# Patient Record
Sex: FEMALE | Race: BLACK OR AFRICAN AMERICAN | NOT HISPANIC OR LATINO | Employment: FULL TIME | URBAN - METROPOLITAN AREA
[De-identification: names, ages, dates, MRNs, and addresses within clinical notes are randomized per-mention and may not be internally consistent; named-entity substitution may affect disease eponyms.]

---
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
## 2023-09-08 ENCOUNTER — HOSPITAL ENCOUNTER (EMERGENCY)
Facility: CLINIC | Age: 18
Discharge: HOME | End: 2023-09-08
Attending: EMERGENCY MEDICINE | Admitting: EMERGENCY MEDICINE
Payer: COMMERCIAL

## 2023-09-08 ENCOUNTER — APPOINTMENT (OUTPATIENT)
Dept: GENERAL RADIOLOGY | Facility: CLINIC | Age: 18
End: 2023-09-08
Attending: EMERGENCY MEDICINE
Payer: COMMERCIAL

## 2023-09-08 VITALS
SYSTOLIC BLOOD PRESSURE: 118 MMHG | HEART RATE: 58 BPM | OXYGEN SATURATION: 99 % | DIASTOLIC BLOOD PRESSURE: 87 MMHG | TEMPERATURE: 98 F | RESPIRATION RATE: 16 BRPM

## 2023-09-08 DIAGNOSIS — R07.9 CHEST PAIN, UNSPECIFIED TYPE: ICD-10-CM

## 2023-09-08 LAB
ANION GAP SERPL CALCULATED.3IONS-SCNC: 12 MMOL/L (ref 7–15)
ATRIAL RATE - MUSE: 56 BPM
BASOPHILS # BLD AUTO: 0 10E3/UL (ref 0–0.2)
BASOPHILS NFR BLD AUTO: 0 %
BUN SERPL-MCNC: 8.2 MG/DL (ref 6–20)
CALCIUM SERPL-MCNC: 9 MG/DL (ref 8.6–10)
CHLORIDE SERPL-SCNC: 106 MMOL/L (ref 98–107)
CREAT SERPL-MCNC: 0.79 MG/DL (ref 0.51–0.95)
DEPRECATED HCO3 PLAS-SCNC: 23 MMOL/L (ref 22–29)
DIASTOLIC BLOOD PRESSURE - MUSE: NORMAL MMHG
EGFRCR SERPLBLD CKD-EPI 2021: >90 ML/MIN/1.73M2
EOSINOPHIL # BLD AUTO: 0.2 10E3/UL (ref 0–0.7)
EOSINOPHIL NFR BLD AUTO: 4 %
ERYTHROCYTE [DISTWIDTH] IN BLOOD BY AUTOMATED COUNT: 12.5 % (ref 10–15)
GLUCOSE SERPL-MCNC: 105 MG/DL (ref 70–99)
HCG INTACT+B SERPL-ACNC: <1 MIU/ML
HCT VFR BLD AUTO: 41 % (ref 35–47)
HGB BLD-MCNC: 13.6 G/DL (ref 11.7–15.7)
IMM GRANULOCYTES # BLD: 0 10E3/UL
IMM GRANULOCYTES NFR BLD: 0 %
INTERPRETATION ECG - MUSE: NORMAL
LYMPHOCYTES # BLD AUTO: 2.6 10E3/UL (ref 0.8–5.3)
LYMPHOCYTES NFR BLD AUTO: 44 %
MCH RBC QN AUTO: 29.1 PG (ref 26.5–33)
MCHC RBC AUTO-ENTMCNC: 33.2 G/DL (ref 31.5–36.5)
MCV RBC AUTO: 88 FL (ref 78–100)
MONOCYTES # BLD AUTO: 0.5 10E3/UL (ref 0–1.3)
MONOCYTES NFR BLD AUTO: 8 %
NEUTROPHILS # BLD AUTO: 2.6 10E3/UL (ref 1.6–8.3)
NEUTROPHILS NFR BLD AUTO: 44 %
NRBC # BLD AUTO: 0 10E3/UL
NRBC BLD AUTO-RTO: 0 /100
P AXIS - MUSE: 37 DEGREES
PLATELET # BLD AUTO: 261 10E3/UL (ref 150–450)
POTASSIUM SERPL-SCNC: 3.8 MMOL/L (ref 3.4–5.3)
PR INTERVAL - MUSE: 140 MS
QRS DURATION - MUSE: 82 MS
QT - MUSE: 416 MS
QTC - MUSE: 401 MS
R AXIS - MUSE: 40 DEGREES
RBC # BLD AUTO: 4.68 10E6/UL (ref 3.8–5.2)
SODIUM SERPL-SCNC: 141 MMOL/L (ref 136–145)
SYSTOLIC BLOOD PRESSURE - MUSE: NORMAL MMHG
T AXIS - MUSE: 43 DEGREES
TROPONIN T SERPL HS-MCNC: <6 NG/L
VENTRICULAR RATE- MUSE: 56 BPM
WBC # BLD AUTO: 5.8 10E3/UL (ref 4–11)

## 2023-09-08 PROCEDURE — 85025 COMPLETE CBC W/AUTO DIFF WBC: CPT | Performed by: EMERGENCY MEDICINE

## 2023-09-08 PROCEDURE — 84484 ASSAY OF TROPONIN QUANT: CPT | Performed by: EMERGENCY MEDICINE

## 2023-09-08 PROCEDURE — 80048 BASIC METABOLIC PNL TOTAL CA: CPT | Performed by: EMERGENCY MEDICINE

## 2023-09-08 PROCEDURE — 71046 X-RAY EXAM CHEST 2 VIEWS: CPT

## 2023-09-08 PROCEDURE — 99285 EMERGENCY DEPT VISIT HI MDM: CPT | Mod: 25

## 2023-09-08 PROCEDURE — 36415 COLL VENOUS BLD VENIPUNCTURE: CPT | Performed by: EMERGENCY MEDICINE

## 2023-09-08 PROCEDURE — 93005 ELECTROCARDIOGRAM TRACING: CPT

## 2023-09-08 PROCEDURE — 84702 CHORIONIC GONADOTROPIN TEST: CPT | Performed by: EMERGENCY MEDICINE

## 2023-09-08 ASSESSMENT — ACTIVITIES OF DAILY LIVING (ADL): ADLS_ACUITY_SCORE: 37

## 2023-09-08 NOTE — ED TRIAGE NOTES
Pt had chest pain and heaviness on left side of body   Pt states is started around 3 am

## 2023-09-08 NOTE — ED NOTES
Bed: ED27  Expected date:   Expected time:   Means of arrival:   Comments:  HEMS 416 18F heaviness on left side of body eta 0338

## 2023-09-08 NOTE — ED PROVIDER NOTES
History     Chief Complaint:  Chest Pain       HPI   Malika Marie is a 18 year old female who present to the emergency department for evaluation of chest pain. Patient reports that she was sleeping last night when she woke up at 0200/0300 in the morning with sharp chest pain and pressure accompanied by shortness of breath and nausea. This pain has occurred every day, usually while walking. It radiates to her back and is alleviated when she stops walking. This has been going on for about 3 weeks. It is exacerbated by eating and drinking and is occasionally accompanied by an acid taste. Spicy or fatty foods do not seem to make it worse. Patient has no history of asthma or heart problems and is not on any medications. She has not had any recent travel or surgery and has no history of blood clots. She has no family history of heart attacks, blood clots, or sudden unexplained death. She is not on birth control. Patient denies abdominal pain and pain or swelling of the lower extremities.       Independent Historian:   None - Patient Only    Review of External Notes:          Medications:    Celexa    Past Medical History:    Child foster care  Adolescent depression   Adjustment disorder with mixed disturbance of emotions and conduct     Physical Exam   Patient Vitals for the past 24 hrs:   BP Temp Temp src Pulse Resp SpO2   09/08/23 0344 118/87 98  F (36.7  C) Oral 58 16 99 %        Physical Exam  Constitutional: Well appearing.  HEENT: Atraumatic.  PERRL.  EOMI.  Moist mucous membranes.  Neck: Soft.  Supple.  No JVD.  Cardiac: Regular rate and rhythm.  No murmur or rub.  Respiratory: Clear to auscultation bilaterally.  No respiratory distress.  No wheezing, rhonchi, or rales.  Abdomen: Soft and nontender.  No rebound or guarding.  Nondistended.  Musculoskeletal: No edema.  Normal range of motion.  Neurologic: Alert and oriented x3.  Normal tone and bulk.  No facial drooping. Normal speech.  5/5 strength in bilateral  upper and lower extremities.  Sensation to light touch intact throughout.  Normal gait.  Skin: No rashes.  No edema.  Psych: Normal affect.  Normal behavior.          Emergency Department Course   ECG  ECG results from 09/08/23   EKG 12-lead, tracing only     Value    Systolic Blood Pressure     Diastolic Blood Pressure     Ventricular Rate 56    Atrial Rate 56    MT Interval 140    QRS Duration 82        QTc 401    P Axis 37    R AXIS 40    T Axis 43    Interpretation ECG      Sinus bradycardia with sinus arrhythmia  Otherwise normal ECG  No previous ECGs available         Imaging:  XR Chest 2 Views   Final Result   IMPRESSION: Negative chest.         Report per radiology    Laboratory:  Labs Ordered and Resulted from Time of ED Arrival to Time of ED Departure   BASIC METABOLIC PANEL - Abnormal       Result Value    Sodium 141      Potassium 3.8      Chloride 106      Carbon Dioxide (CO2) 23      Anion Gap 12      Urea Nitrogen 8.2      Creatinine 0.79      Calcium 9.0      Glucose 105 (*)     GFR Estimate >90     TROPONIN T, HIGH SENSITIVITY - Normal    Troponin T, High Sensitivity <6     HCG QUANTITATIVE PREGNANCY - Normal    hCG Quantitative <1     CBC WITH PLATELETS AND DIFFERENTIAL    WBC Count 5.8      RBC Count 4.68      Hemoglobin 13.6      Hematocrit 41.0      MCV 88      MCH 29.1      MCHC 33.2      RDW 12.5      Platelet Count 261      % Neutrophils 44      % Lymphocytes 44      % Monocytes 8      % Eosinophils 4      % Basophils 0      % Immature Granulocytes 0      NRBCs per 100 WBC 0      Absolute Neutrophils 2.6      Absolute Lymphocytes 2.6      Absolute Monocytes 0.5      Absolute Eosinophils 0.2      Absolute Basophils 0.0      Absolute Immature Granulocytes 0.0      Absolute NRBCs 0.0          Procedures       Emergency Department Course & Assessments:       Interventions:  Medications - No data to display     Assessments:  0632    Independent Interpretation (X-rays, CTs, rhythm  strip):  Chest xray without infiltrate or pneumothorax    Consultations/Discussion of Management or Tests:  None        Social Determinants of Health affecting care:   None    Disposition:  The patient was discharged to home.     Impression & Plan        Medical Decision Making:  Malika Marie is a 18-year-old woman who is afebrile and hemodynamically stable.  EKG demonstrates sinus bradycardia with no acute ischemic changes on my read.  Troponin is less than 6 and given her age and her high sensitivity troponin on rhythm, this excludes ACS.  She has a very low heart score and can safely discharge home with outpatient follow-up and she is in agreement.  She has no tachycardia, pleuritic chest pain, hypoxia, or risk factors for PE DVT and is PERC rule negative.  I doubt a PE.  Chest x-ray is unremarkable for pneumothorax, pneumomediastinum, or other.  The symptoms of been ongoing for now months.  She also expressed some upper back lower neck pain and some heaviness of her left arm, however, neurologic exam is completely normal.  No indication for emergent imaging.  Given the symptoms ongoing for months with a normal neurologic exam, I think she is safe for outpatient follow-up and I placed a referral for primary care clinic if she does not have a primary care physician she tells me.  She is in agreement with plan and feels comfortable with this plan.  We also discussed potential GERD causing her chest pain, particular when she wakes up at night with it.  She will trial a PPI or H2 blocker and follow-up with her primary care physician and utilize over-the-counter Tums, Maalox, Mylanta.  Discussed port of care at home and strict return precautions were given.  Questions were answered and she was in no distress at time of discharge.      Diagnosis:    ICD-10-CM    1. Chest pain, unspecified type  R07.9 Primary Care Referral           Discharge Medications:  Discharge Medication List as of 9/8/2023  6:40 AM          Roland Limon MD  9/8/2023          Roland Limon MD  09/10/23 2468

## 2023-09-11 ENCOUNTER — TELEPHONE (OUTPATIENT)
Dept: FAMILY MEDICINE | Facility: OTHER | Age: 18
End: 2023-09-11

## 2023-09-11 NOTE — TELEPHONE ENCOUNTER
Thank you for letting me know, patient did not show for appointment and did not answer. Registration can we attempt to call her again and see if we can get her with her PCP for follow up.         ELY Ricks CNP  Questions or concerns please feel free to send me a [employer] mobile message or call me  Phone : [phone]

## 2023-09-11 NOTE — TELEPHONE ENCOUNTER
Patient Contact    Attempt # 1    Was call answered?  No.  Left message on voicemail with information to call me back.    Kendra Carr, REBECCAN, RN, PHN  Registered Nurse-Clinic Triage  [facility]  9/11/2023 at 9:57 AM

## 2023-09-11 NOTE — TELEPHONE ENCOUNTER
No answer. No return call. Past appointment time.     Kendra Carr, REBECCAN, RN, PHN  Registered Nurse-Clinic Triage  [facility]  9/11/2023 at 2:15 PM

## 2023-09-11 NOTE — TELEPHONE ENCOUNTER
Patient scheduled with me for follow up ED for chest pain, please triage to make sure she is stable and then  I really think this should be done in person, please reschedule with PCP or see if patient can get in with local clinic.       ELY Ricks CNP  Questions or concerns please feel free to send me a [employer] message or call me  Phone : [phone]

## 2023-09-13 ENCOUNTER — NURSE TRIAGE (OUTPATIENT)
Dept: FAMILY MEDICINE | Facility: CLINIC | Age: 18
End: 2023-09-13
Payer: COMMERCIAL

## 2023-09-13 NOTE — TELEPHONE ENCOUNTER
She states the top of her neck pushes forwards and she feels pressure in her throat.    This has been going on for 1 month.  She had some pain in her upper and lower back.  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  She states she has pain every day.  She has tried Ibuprofen and this helps.  She also has chest pain when she walks.  She also has problems with breathing with the chest pain.  Her left side sometimes feels tight and numb sometimes.    No chest pain right now.    Per protocol patient advised to go to the ER. She states she had this evaluated. She wants to be seen in clinic.    She will go to er if she gets worse.  Lori Martell RN on 9/13/2023 at 4:29 PM      Reason for Disposition   Chest pain   Chest pain or 'angina' comes and goes and is happening more often (increasing in frequency) or getting worse (increasing in severity) (Exception: Chest pains that last only a few seconds.)    Additional Information   Negative: SEVERE difficulty breathing (e.g., struggling for each breath, speaks in single words)   Negative: Difficult to awaken or acting confused (e.g., disoriented, slurred speech)   Negative: Shock suspected (e.g., cold/pale/clammy skin, too weak to stand, low BP, rapid pulse)   Negative: Passed out (i.e., lost consciousness, collapsed and was not responding)   Negative: Chest pain lasting longer than 5 minutes and over 44 years old   Negative: Chest pain lasting longer than 5 minutes, over 30 years old, and at least one cardiac risk factor (e.g., diabetes mellitus, high blood pressure, high cholesterol, smoker, or strong family history of heart disease)   Negative: Chest pain lasting longer than 5 minutes and history of heart disease (i.e., angina, heart attack, heart failure, bypass surgery, takes nitroglycerin)   Negative: Shock suspected (e.g., cold/pale/clammy skin, too weak to stand, low BP, rapid pulse)   Negative: Similar pain previously and it was from 'heart attack'   Negative:  Similar pain previously from 'angina' and not relieved by nitroglycerin   Negative: Difficult to awaken or acting confused (e.g., disoriented, slurred speech)   Negative: Sounds like a life-threatening emergency to the triager   Negative: Followed an injury to neck (e.g., MVA, sports, impact or collision)   Negative: Chest pain lasting longer than 5 minutes and pain is crushing, pressure-like, or heavy   Negative: Heart beating < 50 beats per minute OR > 140 beats per minute   Negative: Visible sweat on face or sweat dripping down face   Negative: Sounds like a life-threatening emergency to the triager   Negative: Followed an injury to chest   Negative: SEVERE chest pain   Negative: Pain also in shoulder(s) or arm(s) or jaw   Negative: Difficulty breathing   Negative: Cocaine use within last 3 days   Negative: Major surgery in the past month   Negative: Hip or leg fracture (broken bone) in past month (or had cast on leg or ankle in past month)   Negative: Illness requiring prolonged bedrest in past month (e.g., immobilization, long hospital stay)   Negative: Long-distance travel in past month (e.g., car, bus, train, plane; with trip lasting 6 or more hours)   Negative: History of prior 'blood clot' in leg or lungs (i.e., deep vein thrombosis, pulmonary embolism)   Negative: History of inherited increased risk of blood clots (e.g., Factor 5 Leiden, Anti-thrombin 3, Protein C or Protein S deficiency, Prothrombin mutation)   Negative: Cancer treatment in the past two months (or has cancer now)   Negative: Heart beating irregularly or very rapidly   Negative: Chest pain lasting longer than 5 minutes and occurred in last 3 days (72 hours) (Exception: Feels exactly the same as previously diagnosed heartburn and has accompanying sour taste in mouth.)    Protocols used: Neck Pain or Dqlikbfhd-P-IU, Chest Pain-A-OH

## 2023-10-23 ENCOUNTER — OFFICE VISIT (OUTPATIENT)
Dept: FAMILY MEDICINE | Facility: CLINIC | Age: 18
End: 2023-10-23

## 2023-10-23 VITALS
BODY MASS INDEX: 28.26 KG/M2 | WEIGHT: 153.6 LBS | DIASTOLIC BLOOD PRESSURE: 68 MMHG | SYSTOLIC BLOOD PRESSURE: 112 MMHG | HEIGHT: 62 IN | OXYGEN SATURATION: 98 % | HEART RATE: 68 BPM

## 2023-10-23 DIAGNOSIS — J01.90 ACUTE NON-RECURRENT SINUSITIS, UNSPECIFIED LOCATION: Primary | ICD-10-CM

## 2023-10-23 DIAGNOSIS — E04.9 ENLARGED THYROID: ICD-10-CM

## 2023-10-23 DIAGNOSIS — M41.82: ICD-10-CM

## 2023-10-23 DIAGNOSIS — J30.2 SEASONAL ALLERGIC RHINITIS, UNSPECIFIED TRIGGER: ICD-10-CM

## 2023-10-23 DIAGNOSIS — E55.9 VITAMIN D DEFICIENCY: ICD-10-CM

## 2023-10-23 DIAGNOSIS — R53.83 OTHER FATIGUE: ICD-10-CM

## 2023-10-23 DIAGNOSIS — K21.00 GASTROESOPHAGEAL REFLUX DISEASE WITH ESOPHAGITIS WITHOUT HEMORRHAGE: ICD-10-CM

## 2023-10-23 DIAGNOSIS — Z23 NEED FOR VACCINATION: ICD-10-CM

## 2023-10-23 DIAGNOSIS — R06.09 DYSPNEA ON EXERTION: ICD-10-CM

## 2023-10-23 PROBLEM — R46.89 AGGRESSIVE BEHAVIOR: Status: RESOLVED | Noted: 2017-07-09 | Resolved: 2023-10-23

## 2023-10-23 LAB
FEF 25/75: NORMAL
FERRITIN SERPL-MCNC: 19 NG/ML (ref 6–175)
FEV-1: NORMAL
FEV1/FVC: NORMAL
FVC: NORMAL
IRON BINDING CAPACITY (ROCHE): 320 UG/DL (ref 240–430)
IRON SATN MFR SERPL: 41 % (ref 15–46)
IRON SERPL-MCNC: 132 UG/DL (ref 37–145)
T3FREE SERPL-MCNC: 3.2 PG/ML (ref 2–4.4)
TSH SERPL DL<=0.005 MIU/L-ACNC: 0.68 UIU/ML (ref 0.3–4.2)
VIT D+METAB SERPL-MCNC: 10 NG/ML (ref 20–50)

## 2023-10-23 PROCEDURE — 84481 FREE ASSAY (FT-3): CPT

## 2023-10-23 PROCEDURE — 99204 OFFICE O/P NEW MOD 45 MIN: CPT

## 2023-10-23 PROCEDURE — 82306 VITAMIN D 25 HYDROXY: CPT

## 2023-10-23 PROCEDURE — 84443 ASSAY THYROID STIM HORMONE: CPT

## 2023-10-23 PROCEDURE — 83550 IRON BINDING TEST: CPT

## 2023-10-23 PROCEDURE — 82728 ASSAY OF FERRITIN: CPT

## 2023-10-23 PROCEDURE — 83540 ASSAY OF IRON: CPT

## 2023-10-23 PROCEDURE — 90471 IMMUNIZATION ADMIN: CPT | Mod: 59

## 2023-10-23 PROCEDURE — 36415 COLL VENOUS BLD VENIPUNCTURE: CPT

## 2023-10-23 PROCEDURE — 90674 CCIIV4 VAC NO PRSV 0.5 ML IM: CPT

## 2023-10-23 PROCEDURE — 94010 BREATHING CAPACITY TEST: CPT

## 2023-10-23 RX ORDER — IPRATROPIUM BROMIDE 21 UG/1
2 SPRAY, METERED NASAL EVERY 12 HOURS
Qty: 30 ML | Refills: 0 | Status: SHIPPED | OUTPATIENT
Start: 2023-10-23 | End: 2024-03-04

## 2023-10-23 RX ORDER — ALBUTEROL SULFATE 90 UG/1
1-2 AEROSOL, METERED RESPIRATORY (INHALATION) EVERY 4 HOURS PRN
Qty: 18 G | Refills: 0 | Status: SHIPPED | OUTPATIENT
Start: 2023-10-23 | End: 2023-11-19

## 2023-10-23 RX ORDER — DOXYCYCLINE 100 MG/1
100 CAPSULE ORAL 2 TIMES DAILY
Qty: 14 CAPSULE | Refills: 0 | Status: SHIPPED | OUTPATIENT
Start: 2023-10-23 | End: 2023-10-30

## 2023-10-23 RX ORDER — DOXYCYCLINE 100 MG/1
100 CAPSULE ORAL 2 TIMES DAILY
Qty: 14 CAPSULE | Refills: 0 | Status: SHIPPED | OUTPATIENT
Start: 2023-10-23 | End: 2023-10-23

## 2023-10-23 RX ORDER — INHALER, ASSIST DEVICES
SPACER (EA) MISCELLANEOUS
Qty: 1 EACH | Refills: 0 | Status: SHIPPED | OUTPATIENT
Start: 2023-10-23

## 2023-10-23 RX ORDER — IPRATROPIUM BROMIDE 21 UG/1
2 SPRAY, METERED NASAL EVERY 12 HOURS
Qty: 30 ML | Refills: 0 | Status: SHIPPED | OUTPATIENT
Start: 2023-10-23 | End: 2023-10-23

## 2023-10-23 ASSESSMENT — ANXIETY QUESTIONNAIRES
GAD7 TOTAL SCORE: 14
3. WORRYING TOO MUCH ABOUT DIFFERENT THINGS: MORE THAN HALF THE DAYS
IF YOU CHECKED OFF ANY PROBLEMS ON THIS QUESTIONNAIRE, HOW DIFFICULT HAVE THESE PROBLEMS MADE IT FOR YOU TO DO YOUR WORK, TAKE CARE OF THINGS AT HOME, OR GET ALONG WITH OTHER PEOPLE: SOMEWHAT DIFFICULT
7. FEELING AFRAID AS IF SOMETHING AWFUL MIGHT HAPPEN: SEVERAL DAYS
5. BEING SO RESTLESS THAT IT IS HARD TO SIT STILL: MORE THAN HALF THE DAYS
6. BECOMING EASILY ANNOYED OR IRRITABLE: MORE THAN HALF THE DAYS
GAD7 TOTAL SCORE: 14
1. FEELING NERVOUS, ANXIOUS, OR ON EDGE: NEARLY EVERY DAY
2. NOT BEING ABLE TO STOP OR CONTROL WORRYING: MORE THAN HALF THE DAYS

## 2023-10-23 ASSESSMENT — PATIENT HEALTH QUESTIONNAIRE - PHQ9
SUM OF ALL RESPONSES TO PHQ QUESTIONS 1-9: 19
5. POOR APPETITE OR OVEREATING: MORE THAN HALF THE DAYS

## 2023-10-23 NOTE — PATIENT INSTRUCTIONS
Omeprazole 20mg daily for 1 month    Atrovent nasal spray daily for 1 month    Follow up with me in 1 month or whenever you would like for antidepressant/anti-anxiety medication    Drink more water

## 2023-10-23 NOTE — LETTER
My Depression Action Plan  Name: Malika Marie   Date of Birth 2005  Date: 10/23/2023    My doctor: Daniella James   My clinic: [facility] [address] [place] [address] [phone]            GREEN    ZONE   Good Control    What it looks like:   Things are going generally well. You have normal ups and downs. You may even feel depressed from time to time, but bad moods usually last less than a day.   What you need to do:  Continue to care for yourself (see self care plan)  Check your depression survival kit and update it as needed  Follow your physician s recommendations including any medication.  Do not stop taking medication unless you consult with your physician first.             YELLOW         ZONE Getting Worse    What it looks like:   Depression is starting to interfere with your life.   It may be hard to get out of bed; you may be starting to isolate yourself from others.  Symptoms of depression are starting to last most all day and this has happened for several days.   You may have suicidal thoughts but they are not constant.   What you need to do:     Call your care team. Your response to treatment will improve if you keep your care team informed of your progress. Yellow periods are signs an adjustment may need to be made.     Continue your self-care.  Just get dressed and ready for the day.  Don't give yourself time to talk yourself out of it.    Talk to someone in your support network.    Open up your Depression Self-Care Plan/Wellness Kit.             RED    ZONE Medical Alert - Get Help    What it looks like:   Depression is seriously interfering with your life.   You may experience these or other symptoms: You can t get out of bed most days, can t work or engage in other necessary activities, you have trouble taking care of basic hygiene, or basic responsibilities, thoughts of suicide or death that will not go away, self-injurious behavior.     What  you need to do:  Call your care team and request a same-day appointment. If they are not available (weekends or after hours) call your local crisis line, emergency room or 911.          Depression Self-Care Plan / Wellness Kit    Many people find that medication and therapy are helpful treatments for managing depression. In addition, making small changes to your everyday life can help to boost your mood and improve your wellbeing. Below are some tips for you to consider. Be sure to talk with your medical provider and/or behavioral health consultant if your symptoms are worsening or not improving.     Sleep   Sleep hygiene  means all of the habits that support good, restful sleep. It includes maintaining a consistent bedtime and wake time, using your bedroom only for sleeping or sex, and keeping the bedroom dark and free of distractions like a computer, smartphone, or television.     Develop a Healthy Routine  Maintain good hygiene. Get out of bed in the morning, make your bed, brush your teeth, take a shower, and get dressed. Don t spend too much time viewing media that makes you feel stressed. Find time to relax each day.    Exercise  Get some form of exercise every day. This will help reduce pain and release endorphins, the  feel good  chemicals in your brain. It can be as simple as just going for a walk or doing some gardening, anything that will get you moving.      Diet  Strive to eat healthy foods, including fruits and vegetables. Drink plenty of water. Avoid excessive sugar, caffeine, alcohol, and other mood-altering substances.     Stay Connected with Others  Stay in touch with friends and family members.    Manage Your Mood  Try deep breathing, massage therapy, biofeedback, or meditation. Take part in fun activities when you can. Try to find something to smile about each day.     Psychotherapy  Be open to working with a therapist if your provider recommends it.     Medication  Be sure to take your  medication as prescribed. Most anti-depressants need to be taken every day. It usually takes several weeks for medications to work. Not all medicines work for all people. It is important to follow-up with your provider to make sure you have a treatment plan that is working for you. Do not stop your medication abruptly without first discussing it with your provider.    Crisis Resources   These hotlines are for both adults and children. They and are open 24 hours a day, 7 days a week unless noted otherwise.    National Suicide Prevention Lifeline   988 or 3-721-928-SPWO (8611)    Crisis Text Line    www.crisistextline.org  Text HOME to 876254 from anywhere in the [place], anytime, about any type of crisis. A live, trained crisis counselor will receive the text and respond quickly.    [facility] Lifeline for LGBTQ Youth  A national crisis intervention and suicide lifeline for LGBTQ youth under 25. Provides a safe place to talk without judgement. Call 1-339.550.8687; text START to 885878 or visit www.the@[employer].org to talk to a trained counselor.    For [facility] crisis numbers, visit the [facility] website at:  https://[place].gov/dhs/people-we-serve/adults/health-care/mental-health/resources/crisis-contacts.jsp

## 2023-10-23 NOTE — PROGRESS NOTES
Assessment & Plan     Acute non-recurrent sinusitis, unspecified location  -Education about adverse effects of prescription medication discussed  -Red flags that warrant emergent evaluation discussed  -Patient verbalized understanding and is agreeable to the discussed plan of care.  -lifestyle and nonpharmacological interventions discussed     - doxycycline hyclate (VIBRAMYCIN) 100 MG capsule  Dispense: 14 capsule; Refill: 0    Seasonal allergic rhinitis, unspecified trigger  -blue/pale turbinates, suspect this may have caused sinusitis, advise atrovent and possible cetirizine for allergic rhinitis   - ipratropium (ATROVENT) 0.03 % nasal spray  Dispense: 30 mL; Refill: 0    Dyspnea on exertion  -suspect r/t reactive airway given spirometry test evaluation today   -will trial use of albuterol inhaler for 2 weeks and re-evaluate  -Education about adverse effects of prescription medication discussed  -Red flags that warrant emergent evaluation discussed  -Patient verbalized understanding and is agreeable to the discussed plan of care.    - Spirometry, Breathing Capacity  - VENOUS COLLECTION  - albuterol (PROAIR HFA/PROVENTIL HFA/VENTOLIN HFA) 108 (90 Base) MCG/ACT inhaler  Dispense: 18 g; Refill: 0  - spacer (OPTICHAMBER SILVIA) holding chamber  Dispense: 1 each; Refill: 0    Enlarged thyroid  -thyroid mildly enlarged, will check levels today   - TSH with free T4 reflex  - T3 Free  - VENOUS COLLECTION  - TSH with free T4 reflex  - T3 Free    Other fatigue  -patient had history of anemia in the past, given constant fatigue and shortness of breath will check levels today   - Iron & Iron Binding Capacity  - Ferritin  - Vitamin D Deficiency  - VENOUS COLLECTION  - Iron & Iron Binding Capacity  - Ferritin  - Vitamin D Deficiency    Levoscoliosis of cervical spine  -mild left curve of cervical spine upon exam, suspect contributing to neck pain, will refer to PT  - Physical Therapy Referral    Gastroesophageal reflux  "disease with esophagitis without hemorrhage  -suspect patient has heartburn, should adhere to omeprazole treatment for 1 month prior to discontinuation   -Education about adverse effects of prescription medication discussed  -Patient verbalized understanding and is agreeable to the discussed plan of care.  -Red flags that warrant emergent evaluation discussed    - omeprazole (PRILOSEC) 20 MG DR capsule  Dispense: 90 capsule; Refill: 0    Need for vaccination  -flu vaccine administered   - VACCINE ADMINISTRATION, INITIAL      Ordering of each unique test  Prescription drug management         BMI:   Estimated body mass index is 28.09 kg/m  as calculated from the following:    Height as of this encounter: 1.575 m (5' 2\").    Weight as of this encounter: 69.7 kg (153 lb 9.6 oz).   Weight management plan: Discussed healthy diet and exercise guidelines    See Patient Instructions    Return in about 3 days (around 10/26/2023), or if symptoms worsen or fail to improve, for Follow up .    ELY G[facility]    Oliverio Daly is a 18 year old, presenting for the following health issues:  New Patient (Referred by NILESH Dias) and Thyroid Problem (Just wanting Thyroid checked - having some symptoms that she wants to discuss (googled and thinks its thyroid related))    HPI     Citalopram - had poor appetite and increased mood fluctuations      Feels new bump to anterior neck for a few weeks,  Photophobia, sharp pain to lateral sides of eyes \"feels like someone puts a needle back there\", happens daily and lasts for about a few minutes at a time. Has tried advil, tylenol, ibuprofen does help take the edge off of the pain    Chest heaviness when she is walking uphill, reports shortness of breath during these episodes, otherwise no chest pain at rest. This has been ongoing for a \"few weeks\"   When she eats she does get side pains. Had a cardiac work up in ER that was unremarkable.l Usually the " "chest pain resolves once done with walking up a hill, no worsening and no radiation into extremity. Also report usually has fatigue/little motivation to do things, no dizziness or lightheadedness or vision changes.     Pressure behind eyes and ears with congestion, rhinorrhea for about 1 month    Has a broad diet, works retail at [employer] - '[employer]' bending a lot at job    Does not have a specific psych - does have ADHD diagnosis, not taking medication. She does have higher readings on CHRISTINE-7 and PHQ-9 today - declines talking about medication intervention or wanting therapy, self harm history with scars along forearms. Denies any thoughts of self harm/suicide today             Review of Systems   Constitutional, HEENT, cardiovascular, pulmonary, gi and gu systems are negative, except as otherwise noted.      Objective    /68   Pulse 68   Ht 1.575 m (5' 2\")   Wt 69.7 kg (153 lb 9.6 oz)   LMP 09/30/2023 (Approximate)   SpO2 98%   BMI 28.09 kg/m    Body mass index is 28.09 kg/m .  Physical Exam   GENERAL: healthy, alert and no distress  HENT: TM right fluid air levels appreciated with effusion, left ear effusion, TMP over maxillary sinus, turbinates swollen pale and erythema with deviation of septum to left otherwise ear canals and TM's normal, nose and mouth without ulcers or lesions  NECK: mild thyromegaly with anterior cervical lymphadenopathy, left worse than right side, otherwise no adenopathy, no asymmetry, masses, or scars, and thyromegaly  RESP: lungs decreased throughout, otherwise - no rales, rhonchi or wheezes aCV: regular rate and rhythm, normal S1 S2, no S3 or S4, no murmur, click or rub, no peripheral edema and peripheral pulses strong  ABDOMEN: soft, nontender, no hepatosplenomegaly, no masses and bowel sounds normal  MS: no gross musculoskeletal defects noted, no edema  SKIN: no suspicious lesions or rashes  NEURO: Normal strength and tone, mentation intact and speech normal    No " results found for this or any previous visit (from the past 24 hour(s)).

## 2023-10-27 RX ORDER — ERGOCALCIFEROL 1.25 MG/1
50000 CAPSULE, LIQUID FILLED ORAL WEEKLY
Qty: 8 CAPSULE | Refills: 0 | Status: SHIPPED | OUTPATIENT
Start: 2023-10-27 | End: 2023-12-16

## 2023-11-02 ENCOUNTER — TELEPHONE (OUTPATIENT)
Dept: FAMILY MEDICINE | Facility: CLINIC | Age: 18
End: 2023-11-02

## 2023-11-02 DIAGNOSIS — E55.9 VITAMIN D DEFICIENCY: ICD-10-CM

## 2023-11-02 DIAGNOSIS — R53.83 OTHER FATIGUE: Primary | ICD-10-CM

## 2023-11-02 NOTE — TELEPHONE ENCOUNTER
----- Message from ELY Gtz CNP sent at 10/27/2023  5:26 PM CDT -----  I attempted to contact patient and could not leave voice message due to full mailbox. If we could try to reach her again next week sometime.     She needs to start on 85071 international units one time per week for 8 weeks then switch to 800 international units daily with recheck in 6 months. I sent this prescription to pharmacy on file.    ELY Gtz CNP on 10/27/2023 at 5:26 PM

## 2023-11-02 NOTE — TELEPHONE ENCOUNTER
Spoke with patient regarding lab results and recommendations per Daniella James CNP.  Informed patient that her Vitamin D level is low and Daniella would like her to take 50,000 international unit(s) once a week for 8 weeks and then recheck.  Patient agrees with plan and will call clinic to schedule lab appointment.  Future lab order entered.  Keerthi Green LPN on 11/2/2023 at 4:20 PM

## 2023-11-19 DIAGNOSIS — R06.09 DYSPNEA ON EXERTION: ICD-10-CM

## 2023-11-19 NOTE — CONFIDENTIAL NOTE
Med: VENTOLIN INH    LOV (related): 10/23/23    Dyspnea on exertion  -suspect r/t reactive airway given spirometry test evaluation today   -will trial use of albuterol inhaler for 2 weeks and re-evaluate      Due for F/U around: OVERDUE    Next Appt: NONE

## 2023-11-20 RX ORDER — ALBUTEROL SULFATE 90 UG/1
1-2 AEROSOL, METERED RESPIRATORY (INHALATION) EVERY 4 HOURS PRN
Qty: 18 G | Refills: 0 | Status: SHIPPED | OUTPATIENT
Start: 2023-11-20

## 2023-12-26 DIAGNOSIS — E55.9 VITAMIN D DEFICIENCY: Primary | ICD-10-CM

## 2023-12-26 NOTE — CONFIDENTIAL NOTE
Med: VIT D 50,000 UNITS WEEKLY  *TO TAKE FOR 8 WEEKS, THEN SWITCH  UNITS DAILY  *SHOULD BE SWITCHED  UNITS DAILY    LOV (related): 10/23/23      Due for F/U around: 4/2024    Next Appt: NONE

## 2023-12-27 RX ORDER — SWAB
2 SWAB, NON-MEDICATED MISCELLANEOUS DAILY
Qty: 180 CAPSULE | Refills: 1 | Status: SHIPPED | OUTPATIENT
Start: 2023-12-27 | End: 2024-03-04

## 2024-03-04 ENCOUNTER — OFFICE VISIT (OUTPATIENT)
Dept: FAMILY MEDICINE | Facility: CLINIC | Age: 19
End: 2024-03-04

## 2024-03-04 VITALS
WEIGHT: 149 LBS | BODY MASS INDEX: 27.42 KG/M2 | HEIGHT: 62 IN | DIASTOLIC BLOOD PRESSURE: 79 MMHG | HEART RATE: 81 BPM | SYSTOLIC BLOOD PRESSURE: 104 MMHG | OXYGEN SATURATION: 96 %

## 2024-03-04 DIAGNOSIS — J30.2 SEASONAL ALLERGIC RHINITIS, UNSPECIFIED TRIGGER: ICD-10-CM

## 2024-03-04 DIAGNOSIS — J39.2 THROAT IRRITATION: Primary | ICD-10-CM

## 2024-03-04 DIAGNOSIS — E55.9 VITAMIN D DEFICIENCY: ICD-10-CM

## 2024-03-04 PROCEDURE — 99213 OFFICE O/P EST LOW 20 MIN: CPT

## 2024-03-04 RX ORDER — AMLODIPINE BESYLATE 2.5 MG/1
2.5 TABLET ORAL DAILY
Status: CANCELLED | OUTPATIENT
Start: 2024-03-04

## 2024-03-04 RX ORDER — IPRATROPIUM BROMIDE 21 UG/1
2 SPRAY, METERED NASAL EVERY 12 HOURS
Qty: 30 ML | Refills: 0 | Status: SHIPPED | OUTPATIENT
Start: 2024-03-04

## 2024-03-04 RX ORDER — SWAB
2 SWAB, NON-MEDICATED MISCELLANEOUS DAILY
Qty: 180 CAPSULE | Refills: 1 | Status: SHIPPED | OUTPATIENT
Start: 2024-03-04

## 2024-03-04 RX ORDER — CETIRIZINE HYDROCHLORIDE 10 MG/1
10 TABLET ORAL DAILY
Qty: 90 TABLET | Refills: 0 | Status: SHIPPED | OUTPATIENT
Start: 2024-03-04 | End: 2024-06-02

## 2024-03-04 NOTE — PROGRESS NOTES
Assessment & Plan     Patient got upset with provider about work up today and left clinic without lab work or me doing a physical exam. I told patient she has a lot of vague symptoms and her lab work that she wants today was normal at most recent visit in 10/2023 - she was seen at this visit for the exact same complaints. I told patient I do not know what is going on with her and mentioned maybe a specialist after work up today, patient stated 'ya it would be nice to have someone who knows what to do.' [place] patient was insinuating I am incompetent and said that I do not appreciate her making me feel this way and she told me I was unprofessional for thinking that is what she meant and left the clinic.      Seasonal allergic rhinitis, unspecified trigger  -believe patient has allergies - did somewhat discuss this with patient and will send in prescription - was not able to do physical exam for this   - ipratropium (ATROVENT) 0.03 % nasal spray  Dispense: 30 mL; Refill: 0  - cetirizine (ZYRTEC) 10 MG tablet  Dispense: 90 tablet; Refill: 0    Vitamin D deficiency  -patient stopped taking vitamin D as she ran out of it, will refill today   - vitamin D3 (CHOLECALCIFEROL) 10 MCG (400 UNIT) capsule  Dispense: 180 capsule; Refill: 1    Throat irritation  -patient may require imaging, I was not able to do a physical exam today, she has stopped taking omeprazole as it was thought she has GERD int the past         See Patient Instructions    No follow-ups on file.    Oliverio Daly is a 18 year old, presenting for the following health issues:  Throat Problem (Has been feeling like throat is swelling off and on since 2022. Sometimes has difficulty swelling (possibly related to asthma). Eyes also feel swollen. Wondering about thyroid, fhx is not well know (foster)) and Consult (Fatigue, sensitivity to cold. 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 She reports her \"throat swells for a few hours per day and then resolves\" feels like something is pressing on her throat, denies any difference throughout the day or any relationship with post prandial pains. She reports this has been intermittent since 2022. Does not feel it is getting worse. Wants thyroid labs checked. No syncope or complete throat closure     2.) fatigue, paleness of skin - she is adamant that she may have anemia - had history of anemia d/t menorrhagia - this has been rechecked and has been stable for years, no menstrual changes since last visit with me on 10/2023 - patient reports all the same symptoms and is requesting all of the same testing today that I completed with her on 10/2023, she also has stopped taking the vitamin D, which she was deficient in at previous visit and was told to continue taking vitamin D      3.) reports her fingers go cold mainly and her feet and nose - white and then turns blue - really bothering patient to the point of her asking for medications today - thinks it is Raynauds, cold in clinic and fingers appear her skin pigmentation without cyanosis/skin color changes     4.) Nausea - with eating and then feels nauseous when she goes to eat. No emesis. Nausea is intermittent.     Symptoms are all very vague and diffuse and when asked if something wrong with any system patient reports something wrong. She is wanting lab work that I did back in October that was normal and nothing has significantly changed in her life since these labs were done. When I saw patient in October there was no mention of her throat closing up. She does have asthma and I did recommend we do spirometry today for a larger work up as asthma may be worsening.  Patient is very fixated on being cold today also states 'I have poor blood flow and can " "feel it in my legs'     Review of Systems  Constitutional, HEENT, cardiovascular, pulmonary, gi and gu systems are negative, except as otherwise noted.      Objective    /79   Pulse 81   Ht 1.581 m (5' 2.25\")   Wt 67.6 kg (149 lb)   SpO2 96%   BMI 27.03 kg/m    Body mass index is 27.03 kg/m .  Physical Exam   GENERAL: alert and no distress  MS: no gross musculoskeletal defects noted, no edema  PSYCH: mentation appears normal and affect flat    No results found for this or any previous visit (from the past 24 hour(s)).        Signed Electronically by: ELY Gtz CNP    "

## 2024-03-19 NOTE — PROGRESS NOTES
12/31/17 2129   Behavioral Health   Hallucinations denies / not responding to hallucinations   Thinking intact   Orientation person: oriented;place: oriented;date: oriented;time: oriented   Memory baseline memory   Insight insight appropriate to situation   Judgement intact   Eye Contact at examiner   Affect full range affect   Mood mood is calm   Physical Appearance/Attire neat;attire appropriate to age and situation   Hygiene well groomed   Suicidality other (see comments)  (patient denies)   1. Wish to be Dead No   2. Non-Specific Active Suicidal Thoughts  No   Self Injury other (see comment)  (patient denies)   Elopement (no elopement concerns or comments this shift)   Activity other (see comment)  (ate dinner with peers and played a board game)   Speech clear;coherent   Psychomotor / Gait balanced;steady   Sleep/Rest/Relaxation   Day/Evening Time Hours up all shift   Safety   Elopement status 15;engagement in unit activities;distraction;room away from unit doors;no shoes   Activities of Daily Living   Hygiene/Grooming independent   Oral Hygiene independent   Dress independent   Room Organization independent   Behavioral Health Interventions   Behavioral Disturbance maintain safe secure environment;provide emotional support;establish therapeutic relationship;build upon strengths;provide positive feedback for use of effective coping skills   Social and Therapeutic Interventions (Behavioral Disturbance) encourage socialization with peers;encourage effective boundaries with peers;encourage participation in therapeutic groups and milieu activities   Patient had appropriate behavior throughout the shift. She played TonZof with me and another patient. Later on she wanted some toast. She was polite and well-mannered when making this request. She then went to her room to read a book, instead of watching a movie with peers. Malika stated that she was bored and calm when I checked in with her. She asked me twice  " Neetu Rios MD   Adult Reconstruction and Joint Replacement Surgery  Phone: [phone]     Fax: [phone]     Follow-up Knee    Name: Nupur Ramires  : 1971  Date of Visit:  2024    CC: Follow-up {LEFT RIGHT BILATERAL:41287} knee     History of Present Illness:  Nupur Ramires is a 52 y.o. female who presents with {Numbers; 1-10:69294} {weeks, months, years:02655} of {LEFT RIGHT BILATERAL:12792} knee pain.     The patient presents for follow-up evaluation of *** knee. They were last seen for this problem on ***. ***    Patient has tried the following {treatments tried:69865}. Date of last steroid injection: ***. Patient {DOES/DOES NOT:39702} have pain at night. Patient is able to walk {Blocks:04351}. Patient is currently using {assistive device:35698} as assistive device. Primarily complains of {pain location:71881} pain. Patient has difficulty with {activity limitations:34780}. The pain is significantly impacting her ability to perform activities of daily living. Patient reports no longer able to do activities such as ***.     Focused History  PMH: {FOCUSEDHX:10669::\"Reviewed\",\"PE/DVT: ***\"}  PSH: {FOCUSEDSHX:78611::\"Reviewed \",\"Hip/Knee replacement: ***\",\"Hip/Knee surgery: ***\",\"Anesthesia complications: ***\",\"Spine surgery: ***\",\"Surgical infection: ***\",\"Weight loss surgery: ***\"}  Meds: {MEDSHX:34394::\"Reviewed\",\"Current Anticoagulants: ***\",\"Weight loss medication: ***\",\"Current Opioids: ***\"}  Allergies: {MEDSHX:58578::\"Reviewed \",\"The patient reports no contraindications or allergies to cephalosporins, aspirin, NSAIDs or opioids, except as noted above.\"}  FH: ***No family history of any bleeding or clotting disorders.  SHx: {SOCX:86491::\"Reviewed\",\"Occupation: ***\",\"Current smoker: ***\",\"EtOH intake weekly: ***\",\"Social support: ***\",\"Preferred physical activities: ***\"}  Bahai: no***    Medications:  Current Outpatient Medications   Medication Instructions    " benzalkonium chloride 0.13 % towelette USE AS DIRECTED    desonide (DesOwen) 0.05 % ointment APPLY SPARINGLY TO AFFECTED AREA(S) ON THE FACE DAILY FOR ITCHING AVOID USE IN THE EYES    EPINEPHrine (EPIPEN) 0.3 mg, intramuscular, Once as needed    ergocalciferol, vitamin D2, 50 mcg (2,000 unit) tablet 2 tablets, oral, Daily    ibuprofen 800 mg tablet TAKE ONE TABLET EVERY 8 HOURS AS NEEDED FOR PAIN    semaglutide (OZEMPIC) 0.25 mg, subcutaneous, Weekly    triamcinolone (Kenalog) 0.1 % cream APPLY 1 APPLICATION TO AFFECTED AREA TWICE DAILY.    Ventolin HFA 90 mcg/actuation inhaler 2 puffs, inhalation, Every 4 hours PRN    Vitamin D3 50 mcg (2,000 unit) tablet TAKE 2 TABLET DAILY       Allergies:  Allergies as of 04/01/2024 - Reviewed 02/07/2024   Allergen Reaction Noted    Spironolactone Angioedema 02/02/2024    Codeine Other 09/18/2023    Ketorolac Hives, Itching, and Swelling 09/18/2023    Lisinopril Angioedema 05/19/2022    Morphine Itching and Swelling 09/18/2023    Bee venom protein (honey bee) Unknown 09/18/2023    Dulaglutide Unknown 09/18/2023    Gabapentin Unknown 09/18/2023    Psyllium husk Hives 09/18/2023    Shellfish containing products Swelling 09/18/2023    Tramadol Itching, Rash, and Other 09/18/2023       Past Medical History:    Past Medical History:   Diagnosis Date    Achilles tendinitis of left lower extremity 09/18/2023    Acute medial meniscus tear 09/18/2023    Allergic reaction 09/18/2023    Angio-edema, sequela 09/18/2023    Closed displaced fracture of fifth metatarsal bone of left foot 09/18/2023    Diverticulosis of intestine, part unspecified, without perforation or abscess without bleeding 08/15/2014    Diverticulosis    Flat feet, bilateral 09/18/2023    Personal history of other diseases of the digestive system 07/23/2014    History of diverticulitis of colon    Personal history of other diseases of the musculoskeletal system and connective tissue     Personal history of scoliosis  during the shift if she can complete her psych testing tomorrow. We told her that tomorrow is a holiday, so testing won't happen before Tuesday. She seemed eager to complete the testing.    Patient did not require seclusion/restraints to manage behavior.    Malika Marie did participate somewhat in unit activities and was visible in the milieu.    Notable mental health symptoms during this shift:None.    Patient is working on these coping/social skills: Sharing feelings  Distraction  Positive social behaviors  Asking for help    Visitors during this shift included None.           Past Surgical History:   Past Surgical History:   Procedure Laterality Date    ANTERIOR CRUCIATE LIGAMENT REPAIR  2014    Primary Repair Of Knee Ligament Cruciate Anterior     SECTION, CLASSIC  2014     Section    OTHER SURGICAL HISTORY  2014    Knee Arthroscopy With Medial Meniscectomy    OTHER SURGICAL HISTORY  2019    Hernia repair       Social History:   Social History     Socioeconomic History    Marital status:      Spouse name: Not on file    Number of children: Not on file    Years of education: Not on file    Highest education level: Not on file   Occupational History    Not on file   Tobacco Use    Smoking status: Former    Smokeless tobacco: Never    Tobacco comments:     Quit 2023   Substance and Sexual Activity    Alcohol use: Defer    Drug use: Defer    Sexual activity: Defer   Other Topics Concern    Not on file   Social History Narrative    Not on file     Social Determinants of Health     Financial Resource Strain: Not on file   Food Insecurity: Not on file   Transportation Needs: Not on file   Physical Activity: Not on file   Stress: Not on file   Social Connections: Not on file   Intimate Partner Violence: Not on file   Housing Stability: Not on file       Family History:   Family History   Problem Relation Name Age of Onset    Diabetes Mother      Hypertension Mother      Scoliosis Mother      Crohn's disease Father      Hypertension Father      Heart disease Father      Breast cancer Sister  61    Graves' disease Sibling         Review of Systems: Review of systems completed with medical assistant intake. Please refer to this note.     Physical Exam:  BMI: There is no height or weight on file to calculate BMI., which is ***abnormal. ***Encouraged to lose weight and to follow up with PCP.    General: The patient is well-appearing, alert and oriented to time, place and person and has an appropriate affect.     Neurological Examination:  "Sensation normal, motor function normal, coordination / cerebellum normal, reflexes normal.***    Cardiovascular Exam: Capillary refill normal, arterial pulses normal***, no varicose veins, no edema.    Skin Exam: Skin throughout the upper and lower extremities is normal without any evidence of infection or rash.    Gait: patient ambulates with an antalgic*** gait.***    Right Hip Examination:  The skin is intact over the hip.    There is no tenderness over the greater trochanter.    Range of motion is full extension to 100 degrees of flexion.    The hip is stable without subluxation or dislocation.    The hip internally rotates to 15 degrees and externally rotates to 45 degrees.    There is no pain with hip motion.    Left Hip Examination:  The skin is intact over the hip.    There is no tenderness over the greater trochanter.    Range of motion is full extension to 100 degrees of flexion.    The hip is stable without subluxation or dislocation.    The hip internally rotates to 15 degrees and externally rotates to 45 degrees.    There is no pain with hip motion.    Left Knee Examination:  Examination of the left knee reveals the skin to be intact.    There is {Blank single:41343::\"no\",\"a small\",\"a large\",\"a moderate\"} effusion in the knee.    The alignment of the knee is {Blank single:68715::\"Valgus\",\"neutral\",\"Varus\"}.    This deformity {Blank single:35537::\"is not\",\"is\"} correctable.    There is tenderness to palpation over the joint line.    There is significant quadriceps atrophy.    Range of Motion: {rom3:27395::\"20\",\"15\",\"10\",\"5\",\"full extension\"} to {rom4:84472::\"less than 90\",\"90\",\"100\",\"110\",\"120\"} degrees of flexion.    The knee is stable to varus-valgus stress and anterior-posterior stress.     There is {no/mild/moderate/severe:19664} grinding with range of motion.    There is {no/mild/moderate/severe:19664} patellofemoral crepitus.    Right Knee Examination:  Examination of the left knee reveals the " "skin to be intact.    There is {Blank single:92001::\"no\",\"a small\",\"a large\",\"a moderate\"} effusion in the knee.    The alignment of the knee is {Blank single:25724::\"Valgus\",\"neutral\",\"Varus\"}.    This deformity {Blank single:21795::\"is not\",\"is\"} correctable.    There is tenderness to palpation over the joint line.    There is significant quadriceps atrophy.    Range of Motion: {rom3:55280::\"20\",\"15\",\"10\",\"5\",\"full extension\"} to {rom4:70076::\"less than 90\",\"90\",\"100\",\"110\",\"120\"} degrees of flexion.    The knee is stable to varus-valgus stress and anterior-posterior stress.     There is {no/mild/moderate/severe:19664} grinding with range of motion.    There is {no/mild/moderate/severe:19664} patellofemoral crepitus.    Imaging:  X-rays were personally reviewed today and show ***implants in good position with no evidence of complication.    ***Radiographs were personally reviewed today. There is evidence of {mild, moderate, severe:22296} {LEFT RIGHT BILATERAL:58732} knee osteoarthritis {with or without:44843} {medial, lateral, patellofemoral, tri:05286} bone on bone apposition.    Impression and Plan:  52 y.o. female here for follow-up evaluation of {LEFT RIGHT BILATERAL:68075} knee.    No diagnosis found.    ***I have discussed the options in detail with the patient. We have discussed anti-inflammatory medication, activity modification, physical therapy, corticosteroid injections, viscosupplementation injections, ***partial knee replacement surgery and total knee replacement surgery.    ***The risks and benefits of all these treatment options have been discussed in detail. The patient has tried at least 3 months of the above conservative treatments and continues to have disabling pain, impaired activities of daily living and worsened quality of life.  Reviewed the surgical optimization steps to optimize their chances for a successful joint replacement surgery.  Currently their BMI is ***.  They would need to lose " significant weight before being scheduled for surgery. Discussed that obesity is a risk factor for continued progression of osteoarthritis. Each pound of weight loss offloads their hip and knee joints by 3-6 pounds.  The most effective of these options is weight loss mainly through restricting caloric intake.  A referral to a nutritionist was offered***. A referral to bariatric surgery was offered***. Encouraged them to maintain range of motion and strength around the knee joints.  They will continue to implement these strategies in addressing their pain.       ***Recommend the patient continue optimizing nonsurgical treatment interventions as outlined above for management of their knee arthritis.  I would be happy to see them again at any point to discuss surgery if they are more optimized.  The patient verbalizes understanding with the recommendations and treatment plan as outlined above and is in agreement.  Questions were addressed.    ***    RTC: {RTC:28800}    X-rays at next visit: {X-rays:32459}    ***Large Joint Injection 87797: Knee  Consent given by: Patient  Timeout: Immediately prior to procedure the correct patient, procedure, and site was verified. Sterile gloves and semi-sterile technique were used.   Indications: Knee pain and joint swelling.   Location: {LEFT RIGHT BILATERAL:95956} knee  Needle size: 22 G  Approach: Lateral    Medications administered: Please refer to medical assistant note for lot number and expiration date.   Subcutaneous   4 ml of 1% lidocaine     Deep   4 ml of 1% lidocaine   4 mL 0.5% marcaine   2 mL of 40 mg kenalog     Aspirate amount: ***  Aspirate Appearance: ***   ***Analysis: Fluid sent for laboratory analysis, including cell count, differential, culture, crystal, AFB    Patient tolerance: Dressing applied. Patient tolerated the procedure well with no immediate complications.    _____________________  Neetu Rios MD   [facility]  [place]

## 2024-07-15 NOTE — PLAN OF CARE
"Problem: General Plan of Care (Inpatient Behavioral)  Goal: Individualization/Patient Specific Goal (IP Behavioral)  The patient and/or their representative will achieve their patient-specific goals related to the plan of care.    The patient-specific goals include:   Outcome: Therapy, progress toward functional goals is gradual  Pt attended first therapy group this afternoon.  OT group focus was social interaction through group team collaboration.  Pt appeared flat and quietly irritable.  She participated with encouragement but refused to do certain aspects of the activity. \"I only like drawing.\"  She requested drawing materials for a choice activity and navi a detailed and skilled drawing of a sad clown juggling bones.  When complimented on her drawing, she appeared surprised.  Pt refused the initial written self assessment.  Plan to complete assessment.      " [id number]

## 2024-07-30 ENCOUNTER — APPOINTMENT (OUTPATIENT)
Dept: GENERAL RADIOLOGY | Facility: CLINIC | Age: 19
End: 2024-07-30
Attending: EMERGENCY MEDICINE
Payer: COMMERCIAL

## 2024-07-30 ENCOUNTER — HOSPITAL ENCOUNTER (EMERGENCY)
Facility: CLINIC | Age: 19
Discharge: HOME | End: 2024-07-30
Attending: EMERGENCY MEDICINE | Admitting: EMERGENCY MEDICINE
Payer: COMMERCIAL

## 2024-07-30 DIAGNOSIS — S61.259A CAT BITE OF FINGER, INITIAL ENCOUNTER: ICD-10-CM
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
## 2024-07-30 PROCEDURE — 73140 X-RAY EXAM OF FINGER(S): CPT | Mod: LT

## 2024-07-30 PROCEDURE — 90471 IMMUNIZATION ADMIN: CPT | Performed by: EMERGENCY MEDICINE

## 2024-07-30 PROCEDURE — 99283 EMERGENCY DEPT VISIT LOW MDM: CPT | Mod: 25 | Performed by: EMERGENCY MEDICINE

## 2024-07-30 PROCEDURE — 90715 TDAP VACCINE 7 YRS/> IM: CPT | Performed by: EMERGENCY MEDICINE

## 2024-07-30 PROCEDURE — 250N000011 HC RX IP 250 OP 636: Performed by: EMERGENCY MEDICINE

## 2024-07-30 RX ORDER — SULFAMETHOXAZOLE/TRIMETHOPRIM 800-160 MG
1 TABLET ORAL 2 TIMES DAILY
Qty: 14 TABLET | Refills: 0 | Status: SHIPPED | OUTPATIENT
Start: 2024-07-30 | End: 2024-08-06

## 2024-07-30 RX ORDER — METRONIDAZOLE 500 MG/1
500 TABLET ORAL 3 TIMES DAILY
Qty: 21 TABLET | Refills: 0 | Status: SHIPPED | OUTPATIENT
Start: 2024-07-30 | End: 2024-08-06

## 2024-07-30 RX ADMIN — CLOSTRIDIUM TETANI TOXOID ANTIGEN (FORMALDEHYDE INACTIVATED), CORYNEBACTERIUM DIPHTHERIAE TOXOID ANTIGEN (FORMALDEHYDE INACTIVATED), BORDETELLA PERTUSSIS TOXOID ANTIGEN (GLUTARALDEHYDE INACTIVATED), BORDETELLA PERTUSSIS FILAMENTOUS HEMAGGLUTININ ANTIGEN (FORMALDEHYDE INACTIVATED), BORDETELLA PERTUSSIS PERTACTIN ANTIGEN, AND BORDETELLA PERTUSSIS FIMBRIAE 2/3 ANTIGEN 0.5 ML: 5; 2; 2.5; 5; 3; 5 INJECTION, SUSPENSION INTRAMUSCULAR at 17:00

## 2024-07-30 ASSESSMENT — COLUMBIA-SUICIDE SEVERITY RATING SCALE - C-SSRS
2. HAVE YOU ACTUALLY HAD ANY THOUGHTS OF KILLING YOURSELF IN THE PAST MONTH?: NO
1. IN THE PAST MONTH, HAVE YOU WISHED YOU WERE DEAD OR WISHED YOU COULD GO TO SLEEP AND NOT WAKE UP?: NO
6. HAVE YOU EVER DONE ANYTHING, STARTED TO DO ANYTHING, OR PREPARED TO DO ANYTHING TO END YOUR LIFE?: NO

## 2024-07-30 ASSESSMENT — ACTIVITIES OF DAILY LIVING (ADL)
ADLS_ACUITY_SCORE: 35
ADLS_ACUITY_SCORE: 37
ADLS_ACUITY_SCORE: 37

## 2024-07-30 NOTE — LETTER
July 30, 2024      To Whom It May Concern:      Malika Marie was seen in our Emergency Department today, 07/30/24.  I expect her condition to improve over the next 1-2 days.  She may return to work/school when improved.    Sincerely,        Constantino Peters MD        
normal...

## 2024-07-30 NOTE — ED TRIAGE NOTES
Addended by: DEBBIE LUKE on: 3/18/2021 01:51 PM     Modules accepted: Orders     Cat bite to left middle finger, happened last night. Pt states cat is ferral.

## 2024-07-30 NOTE — ED NOTES
Alumifoam finger splint applied to finger. Explained to patient what symptoms to watch out for in regards to spreading infection.

## 2024-07-30 NOTE — ED PROVIDER NOTES
"  Emergency Department Note      History of Present Illness     Chief Complaint   Cat Bite      SONIA Marie is a 18 year old female who presents to the ED with her friend for evaluation of a cat bite. The patient reports that she was bitten by a cat yesterday. She states that the bite occurred at her left middle finger. The finger was purple and swollen yesterday and has began to release pus as of today. She is unable to bend her finger without pain. She is unsure of when her last Tetanus shot was. She denies any previous surgeries. Of note, she does have a thyroid problem and is allergic to penicillin.     Independent Historian   None      Past Medical History     Medical History and Problem List   Adolescent depression  Adjustment disorder    Medications   Albuterol  Cholecalciferol  Celexa    Surgical History   The patient has no pertinent past surgical history.     Physical Exam     Patient Vitals for the past 24 hrs:   BP Temp Pulse Resp SpO2 Height Weight   07/30/24 1606 (P) 116/61 (P) 98.7  F (37.1  C) (P) 65 (P) 16 (P) 100 % (P) 1.575 m (5' 2\") (P) 63 kg (139 lb)     Physical Exam  Eyes:  The pupils are equal and round    Conjunctivae and sclerae are normal  ENT:    The nose is normal    Pinnae are normal  CV:  Regular rate and rhythm     No edema  Resp:  Normal respiratory effort. Speaks in full sentences  GI:  Abdomen is soft, there is no rigidity    No distension    No rebound tenderness   MS:  Normal muscular tone    No asymmetric leg swelling    No tenderness along flexor tendons of the left hand    Full AROM of the left 3rd digit.  Skin:  Mild bruising with minimal swelling associated with a puncture wound on volar left long finger. No bleeding or purulent material noted.  Mild tenderness  Neuro:   Awake, alert.      Speech is normal and fluent.    Face is symmetric.     Moves all extremities    SILT in the distal left long finger    Diagnostics     Lab Results   Labs Ordered and Resulted " from Time of ED Arrival to Time of ED Departure - No data to display    Imaging   Fingers XR, 2-3 views, left   Final Result   IMPRESSION: Normal joint spaces and alignment. No fracture. No foreign body.             EKG   None    Independent Interpretation   X-ray finger shows no fracture or foreign body    ED Course      Medications Administered   Medications   Tdap (tetanus-diphtheria-acell pertussis) (ADACEL) injection 0.5 mL (0.5 mLs Intramuscular $Given 7/30/24 1700)       Procedures   None     Discussion of Management   None    ED Course   ED Course as of 07/30/24 1824   Tue Jul 30, 2024   1637 I obtained history and examined the patient as noted above.        Optional/Additional Documentation  None    Medical Decision Making / Diagnosis     CMS Diagnoses: None    MIPS   None    MDM   Malika Marie is a 18 year old female who presents 1 day after a cat bite of her left long finger.  She is left-hand dominant.  X-rays negative for any bony injury or foreign body.  There are some slight bruising present near the cat bite.  No significant tenderness along her flexor tendon.  Initiated antibiotics with Bactrim and Flagyl.  Recommended follow-up with hand surgery.  Patient referred to hand surgery line.  Discussed warning signs return precautions at length to the high risk nature of the wound.  She verbalized understanding.  She is given a finger splint and instructed to do range of motion exercises several times a day.  She was discharged home encouraged return with any new or worrisome symptoms.    Disposition   The patient was discharged.     Diagnosis     ICD-10-CM    1. Cat bite of finger, initial encounter  S61.259A     W55.01XA            Discharge Medications   New Prescriptions    METRONIDAZOLE (FLAGYL) 500 MG TABLET    Take 1 tablet (500 mg) by mouth 3 times daily for 7 days    SULFAMETHOXAZOLE-TRIMETHOPRIM (BACTRIM DS) 800-160 MG TABLET    Take 1 tablet by mouth 2 times daily for 7 days         Scribe  Disclosure:  I, Hu Qiu, am serving as a scribe at 4:43 PM on 7/30/2024 to document services personally performed by Constantino Peters MD based on my observations and the provider's statements to me.        Constantino Peters MD  07/30/24 1905

## 2024-08-18 NOTE — PLAN OF CARE
Problem: Behavioral Disturbance  Goal: Behavioral Disturbance  Signs and symptoms of listed problems will be absent or manageable.   Outcome: No Change  Pt attended and participated in a structured occupational therapy group session where intervention focused on building skills for teamwork.  She appeared flat and irritable.  She engaged minimally and appeared irritated with peers and therapist.  She reported feeling similar to the previous day.  She left the group after 30 minutes.  Pt appears angry with minimal interest in social engagement.  Plan to continue to engage.       [Dear  ___] : Dear  [unfilled], [Consult Letter:] : I had the pleasure of evaluating your patient, [unfilled]. [Please see my note below.] : Please see my note below. [Consult Closing:] : Thank you very much for allowing me to participate in the care of this patient.  If you have any questions, please do not hesitate to contact me. [Sincerely,] : Sincerely, [FreeTextEntry3] : Keena Duran MD

## 2024-09-06 ENCOUNTER — HOSPITAL ENCOUNTER (EMERGENCY)
Facility: CLINIC | Age: 19
Discharge: HOME | End: 2024-09-06
Attending: EMERGENCY MEDICINE | Admitting: EMERGENCY MEDICINE
Payer: COMMERCIAL

## 2024-09-06 VITALS
SYSTOLIC BLOOD PRESSURE: 112 MMHG | HEART RATE: 61 BPM | OXYGEN SATURATION: 100 % | HEIGHT: 62 IN | DIASTOLIC BLOOD PRESSURE: 70 MMHG | WEIGHT: 140 LBS | TEMPERATURE: 98.1 F | RESPIRATION RATE: 16 BRPM | BODY MASS INDEX: 25.76 KG/M2

## 2024-09-06 DIAGNOSIS — R09.81 CONGESTION OF PARANASAL SINUS: ICD-10-CM

## 2024-09-06 DIAGNOSIS — H93.8X1 IRRITATION OF EAR, RIGHT: ICD-10-CM

## 2024-09-06 PROCEDURE — 99283 EMERGENCY DEPT VISIT LOW MDM: CPT | Performed by: EMERGENCY MEDICINE

## 2024-09-06 RX ORDER — FLUTICASONE PROPIONATE 50 MCG
1 SPRAY, SUSPENSION (ML) NASAL DAILY
Qty: 9.9 ML | Refills: 0 | Status: SHIPPED | OUTPATIENT
Start: 2024-09-06 | End: 2024-10-06

## 2024-09-06 ASSESSMENT — COLUMBIA-SUICIDE SEVERITY RATING SCALE - C-SSRS
1. IN THE PAST MONTH, HAVE YOU WISHED YOU WERE DEAD OR WISHED YOU COULD GO TO SLEEP AND NOT WAKE UP?: NO
2. HAVE YOU ACTUALLY HAD ANY THOUGHTS OF KILLING YOURSELF IN THE PAST MONTH?: NO
6. HAVE YOU EVER DONE ANYTHING, STARTED TO DO ANYTHING, OR PREPARED TO DO ANYTHING TO END YOUR LIFE?: NO

## 2024-09-06 ASSESSMENT — ACTIVITIES OF DAILY LIVING (ADL): ADLS_ACUITY_SCORE: 35

## 2024-09-06 NOTE — ED TRIAGE NOTES
Patient reports she went to a walk in clinic due to a problem with her throat and they told her to go to her PCP clinic for that. Patient then reports that she had ear pain. Patient reports they removed ear wax from her ears. Since then, patient cannot hear from her right ear. Patient reports she was prescribed ear drops to  from [employer]. Patient was then sent to come to the ER to be evaluated for the hearing loss, patient states the doctor told her that was not supposed to happen and has never happened before.

## 2024-09-06 NOTE — ED PROVIDER NOTES
"    [facility] [place] EMERGENCY DEPARTMENT ([facility])    9/06/24      ED PROVIDER NOTE    History     Chief Complaint   Patient presents with    Hearing Problem     Patient had ear wax removed at the clinic today and lost hearing in her right ear following. Patient thought it was normal because of the water but was informed she should come to the ER to be evaluated because it was not supposed to happen      HPI  Malika Marie is a 19 year old female with history of allergic rhinitis and GERD with esophagitis who presents to the ED for evaluation of a hearing problem. Patient reports having ear wax removed from her right ear at a walk in clinic earlier today, following this she has had difficulty hearing out of the ear. She reports it feels like she is under water on that side. Prior to the cleaning she had itchiness and pain in the ear, these have now resolved. She reports being diagnosed with allergic rhinitis about a year ago, she has been having issues with her ear since then and feels these may be related. She reports initially going to the walk in clinic for evaluation due to concerns her thyroid may be inflamed.     Physical Exam   BP: 123/76  Pulse: 56  Temp: 98.6  F (37  C)  Resp: 16  Height: 157.5 cm (5' 2\")  Weight: 63.5 kg (140 lb)  SpO2: 99 %  Physical Exam  General: patient is alert and oriented and in no acute distress   Head: atraumatic and normocephalic   EENT: moist mucus membranes, no tonsillar erythema, swelling, uvula midline, no trismus, no significant erythema or debris noted in the right external auditory canal, right TM without evidence of perforation, erythema or bulging, there is some irregularity along the anterior inferior portion of the TM that appears to be chronic inflammation, no tenderness to palpation of the sinuses  Neck: supple, no induration of the submandibular tissue  Neurological: alert and oriented, moving all extremities symmetrically, gait normal   Skin: warm, dry     ED " Course, Procedures, & Data      Procedures            No results found for any visits on 09/06/24.  Medications - No data to display  Labs Ordered and Resulted from Time of ED Arrival to Time of ED Departure - No data to display  No orders to display          Critical care was not performed.     Medical Decision Making  The patient's presentation was of moderate complexity (an undiagnosed new problem with uncertain prognosis).    The patient's evaluation involved:  discussion of management or test interpretation with another health professional (ENT)    The patient's management necessitated moderate risk (prescription drug management including medications given in the ED).    Assessment & Plan    Ms. Marie is a 19 year old female with history of allergic rhinitis and GERD with esophagitis who presents to the ED for evaluation of a hearing problem.  She is hemodynamically stable, afebrile and in no respiratory distress.  On exam I did not appreciate any perforation of the right TM and is not consistent with acute otitis media or acute otitis externa.  She is able to hear on the right side but feels slightly more muffled after irrigation of the right ear.  She does appear to have some chronic inflammatory changes in the right ear and reports a longstanding history of sinusitis.  Discussed with ENT and will plan to treat with Flonase.  Referral was placed for ENT clinic follow-up.  She will also plan to follow-up with her primary care provider.  She was given close return precautions and voiced understanding.    I have reviewed the nursing notes. I have reviewed the findings, diagnosis, plan and need for follow up with the patient.    New Prescriptions    FLUTICASONE (FLONASE) 50 MCG/ACT NASAL SPRAY    Spray 1 spray into both nostrils daily.       Final diagnoses:   Irritation of ear, right   Congestion of paranasal sinus   Leticia CARO am serving as a trained medical scribe to document services personally  performed by Nora Zamora MD based on the provider's statements to me on September 6, 2024.  This document has been checked and approved by the attending provider.    I, Nora Zamora MD, was physically present and have reviewed and verified the accuracy of this note documented by Leticia Sanchez medical scribe.      Nora Zamora MD  [facility] EMERGENCY DEPARTMENT  9/6/2024     Nora Zamora MD  09/06/24 [id number]

## 2024-09-07 NOTE — DISCHARGE INSTRUCTIONS
Follow up with ENT in clinic as soon as possible.  [facility] will call you to coordinate your care as prescribed by the provider. If you don t hear from a representative within 2 business days, please call [phone].     Take Flonase as prescribed for sinus congestion.    If you have any worsening symptoms including increased pain, decreased hearing, facial pain or other concerns return to the emergency department for reevaluation.

## 2024-10-23 ENCOUNTER — APPOINTMENT (OUTPATIENT)
Dept: GENERAL RADIOLOGY | Facility: CLINIC | Age: 19
End: 2024-10-23
Attending: STUDENT IN AN ORGANIZED HEALTH CARE EDUCATION/TRAINING PROGRAM
Payer: COMMERCIAL

## 2024-10-23 ENCOUNTER — HOSPITAL ENCOUNTER (EMERGENCY)
Facility: CLINIC | Age: 19
Discharge: HOME | End: 2024-10-23
Attending: STUDENT IN AN ORGANIZED HEALTH CARE EDUCATION/TRAINING PROGRAM | Admitting: STUDENT IN AN ORGANIZED HEALTH CARE EDUCATION/TRAINING PROGRAM
Payer: COMMERCIAL

## 2024-10-23 VITALS
RESPIRATION RATE: 16 BRPM | WEIGHT: 139 LBS | BODY MASS INDEX: 25.58 KG/M2 | HEART RATE: 70 BPM | DIASTOLIC BLOOD PRESSURE: 76 MMHG | OXYGEN SATURATION: 99 % | TEMPERATURE: 98.4 F | SYSTOLIC BLOOD PRESSURE: 112 MMHG | HEIGHT: 62 IN

## 2024-10-23 DIAGNOSIS — B34.9 ACUTE VIRAL SYNDROME: ICD-10-CM

## 2024-10-23 LAB
FLUAV RNA SPEC QL NAA+PROBE: NEGATIVE
FLUBV RNA RESP QL NAA+PROBE: NEGATIVE
GROUP A STREP BY PCR: NOT DETECTED
RSV RNA SPEC NAA+PROBE: NEGATIVE
SARS-COV-2 RNA RESP QL NAA+PROBE: NEGATIVE
SARS-COV-2 RNA RESP QL NAA+PROBE: NEGATIVE

## 2024-10-23 PROCEDURE — 87651 STREP A DNA AMP PROBE: CPT | Performed by: STUDENT IN AN ORGANIZED HEALTH CARE EDUCATION/TRAINING PROGRAM

## 2024-10-23 PROCEDURE — 250N000013 HC RX MED GY IP 250 OP 250 PS 637: Performed by: STUDENT IN AN ORGANIZED HEALTH CARE EDUCATION/TRAINING PROGRAM

## 2024-10-23 PROCEDURE — 87637 SARSCOV2&INF A&B&RSV AMP PRB: CPT | Performed by: STUDENT IN AN ORGANIZED HEALTH CARE EDUCATION/TRAINING PROGRAM

## 2024-10-23 PROCEDURE — 99283 EMERGENCY DEPT VISIT LOW MDM: CPT | Performed by: STUDENT IN AN ORGANIZED HEALTH CARE EDUCATION/TRAINING PROGRAM

## 2024-10-23 PROCEDURE — 99284 EMERGENCY DEPT VISIT MOD MDM: CPT | Mod: 25 | Performed by: STUDENT IN AN ORGANIZED HEALTH CARE EDUCATION/TRAINING PROGRAM

## 2024-10-23 PROCEDURE — 87635 SARS-COV-2 COVID-19 AMP PRB: CPT | Performed by: STUDENT IN AN ORGANIZED HEALTH CARE EDUCATION/TRAINING PROGRAM

## 2024-10-23 PROCEDURE — 71046 X-RAY EXAM CHEST 2 VIEWS: CPT

## 2024-10-23 RX ORDER — ACETAMINOPHEN 325 MG/1
975 TABLET ORAL ONCE
Status: COMPLETED | OUTPATIENT
Start: 2024-10-23 | End: 2024-10-23

## 2024-10-23 RX ORDER — IBUPROFEN 600 MG/1
600 TABLET, FILM COATED ORAL ONCE
Status: COMPLETED | OUTPATIENT
Start: 2024-10-23 | End: 2024-10-23

## 2024-10-23 RX ADMIN — ACETAMINOPHEN 975 MG: 325 TABLET, FILM COATED ORAL at 20:12

## 2024-10-23 RX ADMIN — IBUPROFEN 600 MG: 600 TABLET, FILM COATED ORAL at 20:12

## 2024-10-23 ASSESSMENT — ACTIVITIES OF DAILY LIVING (ADL)
ADLS_ACUITY_SCORE: 0
ADLS_ACUITY_SCORE: 0

## 2024-10-23 ASSESSMENT — ENCOUNTER SYMPTOMS: FLU SYMPTOMS: 1

## 2024-10-24 NOTE — ED PROVIDER NOTES
"    [facility] [place] EMERGENCY DEPARTMENT ([facility])    10/23/24      ED PROVIDER NOTE       History     Chief Complaint   Patient presents with    Flu Symptoms     Sneezing, congestion since Saturday. Cough developed Saturday night, productive cough with dark colored mucus. Trouble sleeping due to cough. Body aches, chills, HA, pain 2/10. Noticed white spots on tonsils.        Flu Symptoms    Malika Marie is a 19 year old female who presents to the emergency department for flu symptoms.     She reports that all of her symptoms started on Sunday at which point in time she was feeling congested in her nose, and began sneezing.  Since then she has had a productive cough that was initially of green sputum and now more of a dark green/almost black in color.  Denies any blood.  No chest pain, no shortness of breath, abdominal pain, nausea or vomiting.  Does have a slight sore throat, however states that is gotten better but her primary concern at this point is for whole body pain and aches.        Past Medical History  No past medical history on file.  No past surgical history on file.  albuterol (PROAIR HFA/PROVENTIL HFA/VENTOLIN HFA) 108 (90 Base) MCG/ACT inhaler  ipratropium (ATROVENT) 0.03 % nasal spray  spacer (OPTICHAMBER SILVIA) holding chamber  vitamin D3 (CHOLECALCIFEROL) 10 MCG (400 UNIT) capsule      Allergies   Allergen Reactions    Penicillins Swelling     Family History  No family history on file.  Social History   Social History     Tobacco Use    Smoking status: Never    Smokeless tobacco: Never   Substance Use Topics    Alcohol use: No    Drug use: Yes     Types: Marijuana     Comment: \"every month\"      A complete review of systems was performed with pertinent positives and negatives noted in the HPI, and all other systems negative.    Physical Exam   BP: 114/79  Pulse: 70  Temp: 98.3  F (36.8  C)  Resp: 16  Height: 157.5 cm (5' 2\")  Weight: 63 kg (139 lb)  SpO2: 100 %  Physical Exam  Vitals and " nursing note reviewed.       GEN: Well appearing, non toxic, cooperative, warm to the touch  HEENT: normocephalic and atraumatic, PERRLA, EOMI, bilateral pharyngeal erythema, midline uvula, possible exudates patchy versus tonsilth, bilateral TMs with small amount of free fluid behind but no evidence of any significant erythema or bulging, bilateral anterior cervical lymph nodes swollen without tenderness  CV: well-perfused, normal skin color for ethnicity  PULM: breathing comfortably, in no respiratory distress, clear to auscultation in upper lung fields bilaterally, some coarse breath sounds in bilateral bases  ABD: nondistended, soft, nontender  EXT: Full range of motion.  No edema.  NEURO: awake, conversant, grossly normal bilateral upper and lower extremity strength & ROM   SKIN: No rashes, ecchymosis, or lacerations  PSYCH: Calm and cooperative, interactive     ED Course, Procedures, & Data      Procedures          Results for orders placed or performed during the hospital encounter of 10/23/24   XR Chest 2 Views     Status: None    Narrative    EXAM: XR CHEST 2 VIEWS  LOCATION: [facility]  DATE: 10/23/2024    INDICATION: productive cough  COMPARISON: 9/8/2023      Impression    IMPRESSION: Negative chest.   Symptomatic COVID-19 Virus (Coronavirus) by PCR Nasopharyngeal     Status: Normal    Specimen: Nasopharyngeal; Swab   Result Value Ref Range    SARS CoV2 PCR Negative Negative    Narrative    Testing was performed using the Xpert Xpress SARS-CoV-2 Assay on the [employer] Gene-Xpert Instrument Systems. Additional information about this Emergency Use Authorization (EUA) assay can be found via the Lab Guide. This test should be ordered for the detection of SARS-CoV-2 in individuals who meet SARS-CoV-2 clinical and/or epidemiological criteria as well as from individuals without symptoms or other reasons to suspect COVID-19. Test performance for asymptomatic patients  has only been established in anterior nasal swab specimens. This test is for in vitro diagnostic use under the FDA EUA for laboratories certified under CLIA to perform high complexity testing. This test has not been FDA cleared or approved. A negative result does not rule out the presence of PCR inhibitors in the specimen or target RNA concentration below the limit of detection for the assay. The possibility of a false negative should be considered if the patient's recent exposure or clinical presentation suggests COVID-19. This test was validated by the [facility]. This laboratory is certified under the Clinical Laboratory Improvement Amendments (CLIA) as qualified to perform high complexity laboratory testing.     Symptomatic Influenza A/B, RSV, & SARS-CoV2 PCR (COVID-19) Nose     Status: Normal    Specimen: Nose; Swab   Result Value Ref Range    Influenza A PCR Negative Negative    Influenza B PCR Negative Negative    RSV PCR Negative Negative    SARS CoV2 PCR Negative Negative    Narrative    Testing was performed using the Xpert Xpress CoV2/Flu/RSV Assay on the [employer] Gene[employer]pert Instrument. This test should be ordered for the detection of SARS-CoV-2, influenza, and RSV viruses in individuals who meet clinical and/or epidemiological criteria. Test performance is unknown in asymptomatic patients. This test is for in vitro diagnostic use under the FDA EUA for laboratories certified under CLIA to perform high or moderate complexity testing. This test has not been FDA cleared or approved. A negative result does not rule out the presence of PCR inhibitors in the specimen or target RNA in concentration below the limit of detection for the assay. If only one viral target is positive but coinfection with multiple targets is suspected, the sample should be re-tested with another FDA cleared, approved, or authorized test, if coinfection would change clinical management. This test was  validated by the [facility]. These laboratories are certified under the Clinical Laboratory Improvement Amendments of 1988 (CLIA-88) as qualified to perform high complexity laboratory testing.   Group A Streptococcus PCR Throat Swab     Status: Normal    Specimen: Throat; Swab   Result Value Ref Range    Group A strep by PCR Not Detected Not Detected    Narrative    The Xpert Xpress Strep A test, performed on the [employer], is a rapid, qualitative in vitro diagnostic test for the detection of Streptococcus pyogenes (Group A ß-hemolytic Streptococcus, Strep A) in throat swab specimens from patients with signs and symptoms of pharyngitis. The Xpert Xpress Strep A test can be used as an aid in the diagnosis of Group A Streptococcal pharyngitis. The assay is not intended to monitor treatment for Group A Streptococcus infections. The Xpert Xpress Strep A test utilizes an automated real-time polymerase chain reaction (PCR) to detect Streptococcus pyogenes DNA.     Medications   acetaminophen (TYLENOL) tablet 975 mg (975 mg Oral $Given 10/23/24 2012)   ibuprofen (ADVIL/MOTRIN) tablet 600 mg (600 mg Oral $Given 10/23/24 2012)     Labs Ordered and Resulted from Time of ED Arrival to Time of ED Departure   COVID-19 VIRUS (CORONAVIRUS) BY PCR - Normal       Result Value    SARS CoV2 PCR Negative     INFLUENZA A/B, RSV, & SARS-COV2 PCR - Normal    Influenza A PCR Negative      Influenza B PCR Negative      RSV PCR Negative      SARS CoV2 PCR Negative     GROUP A STREPTOCOCCUS PCR THROAT SWAB - Normal    Group A strep by PCR Not Detected       XR Chest 2 Views   Final Result   IMPRESSION: Negative chest.             Critical care was not performed.     Medical Decision Making  The patient's presentation was of moderate complexity (an acute illness with systemic symptoms).    The patient's evaluation involved:  review of external note(s) from 3+ sources (see separate area of note for  details)  review of 3+ test result(s) ordered prior to this encounter (see separate area of note for details)  ordering and/or review of 3+ test(s) in this encounter (see separate area of note for details)    The patient's management necessitated only low risk treatment.    Assessment & Plan    19-year-old female with a history of allergic rhinitis and GERD presents emergency department due to nasal congestion, coughing that has turned into a productive cough as well as some throat pain and bodyaches noted to be hemodynamically stable although warm on palpation with some coarse breath sounds in bilateral bases but without any focality.    Will plan for COVID, RSV and flu swab as well as strep swab although lower suspicion.  With her productive cough and it being worsening over the last couple of days, will plan for an x-ray    9:06 PM negative workup . Will discharge with viral syndrome instructions    I have reviewed the nursing notes. I have reviewed the findings, diagnosis, plan and need for follow up with the patient.    New Prescriptions    No medications on file       Final diagnoses:   Acute viral syndrome       Krupa Reed MD  [facility] EMERGENCY DEPARTMENT  10/23/2024     Krupa Reed MD  10/23/24 1029

## 2024-10-24 NOTE — DISCHARGE INSTRUCTIONS
You were seen in the emergency department for a viral syndrome.  You had a COVID/RSV/flu swab and a strep swab. These are negative. There are hundreds of viruses that cause symptoms similar to this that we do not typically test for.    Your symptoms may last for a few days to 1 week.  You may experience nasal congestion, runny nose, sore throat, cough, fatigue, nausea, vomiting, diarrhea, abdominal cramping.  Try to get plenty of rest, drink lots of fluids, and plenty of fluids with electrolytes if you are having a difficult time eating any food.    Typically, this is treated with symptomatic treatment:    - Pain or fever - acetaminophen 500-650 mg every 6-8 hours, ibuprofen 400-600mg every 8 hours   - decongestant - pseudoephedrine/Sudafed 2-3 times daily  - nasal steroid - Fluticasone/Flonase 2 sprays in both nostrils twice daily  - nasal irrigation - nettipot or other nasal irrigation. Using previously boiled or distilled water mixed with saline packet, irrigate one nostril allowing water to drip out the other side into a sink. Do this up to 2 times daily, especially before sleep if nighttime congestion is bothersome    Return to the emergency department with high fevers that you cannot break at home with medications, severe worsening difficulty breathing, inability to swallow your own saliva, severe chest pain, significant nausea and vomiting and inability to keep any fluids down, or any other severe worsening symptoms

## 2024-10-24 NOTE — ED TRIAGE NOTES
Sneezing, congestion since Saturday. Cough developed Saturday night, productive cough with dark colored mucus. Trouble sleeping due to cough. Body aches, chills, HA, pain 2/10. Noticed white spots on tonsils.      Triage Assessment (Adult)       Row Name 10/23/24 7544          Triage Assessment    Airway WDL WDL        Respiratory WDL    Respiratory WDL X;cough     Cough Frequency frequent     Cough Type congested;productive        Skin Circulation/Temperature WDL    Skin Circulation/Temperature WDL WDL        Cardiac WDL    Cardiac WDL WDL        Peripheral/Neurovascular WDL    Peripheral Neurovascular WDL WDL        Cognitive/Neuro/Behavioral WDL    Cognitive/Neuro/Behavioral WDL WDL
